# Patient Record
Sex: FEMALE | Race: BLACK OR AFRICAN AMERICAN | Employment: PART TIME | URBAN - METROPOLITAN AREA
[De-identification: names, ages, dates, MRNs, and addresses within clinical notes are randomized per-mention and may not be internally consistent; named-entity substitution may affect disease eponyms.]

---

## 2021-07-23 ENCOUNTER — HOSPITAL ENCOUNTER (OUTPATIENT)
Facility: CLINIC | Age: 31
Setting detail: OBSERVATION
Discharge: HOME | End: 2021-07-24
Attending: EMERGENCY MEDICINE | Admitting: SURGERY
Payer: COMMERCIAL

## 2021-07-23 ENCOUNTER — APPOINTMENT (OUTPATIENT)
Dept: ULTRASOUND IMAGING | Facility: CLINIC | Age: 31
End: 2021-07-23
Payer: COMMERCIAL

## 2021-07-23 DIAGNOSIS — K81.0 ACUTE CHOLECYSTITIS: ICD-10-CM

## 2021-07-23 LAB
ALBUMIN SERPL-MCNC: 3.5 G/DL (ref 3.4–5)
ALBUMIN UR-MCNC: 10 MG/DL
ALP SERPL-CCNC: 111 U/L (ref 40–150)
ALT SERPL W P-5'-P-CCNC: 76 U/L (ref 0–50)
ANION GAP SERPL CALCULATED.3IONS-SCNC: 4 MMOL/L (ref 3–14)
APPEARANCE UR: CLEAR
AST SERPL W P-5'-P-CCNC: 91 U/L (ref 0–45)
BACTERIA #/AREA URNS HPF: ABNORMAL /HPF
BASOPHILS # BLD AUTO: 0.1 10E3/UL (ref 0–0.2)
BASOPHILS NFR BLD AUTO: 1 %
BILIRUB SERPL-MCNC: 0.4 MG/DL (ref 0.2–1.3)
BILIRUB UR QL STRIP: NEGATIVE
BUN SERPL-MCNC: 13 MG/DL (ref 7–30)
CALCIUM SERPL-MCNC: 8.8 MG/DL (ref 8.5–10.1)
CHLORIDE BLD-SCNC: 112 MMOL/L (ref 94–109)
CO2 SERPL-SCNC: 24 MMOL/L (ref 20–32)
COLOR UR AUTO: YELLOW
CREAT SERPL-MCNC: 0.68 MG/DL (ref 0.52–1.04)
EOSINOPHIL # BLD AUTO: 0.1 10E3/UL (ref 0–0.7)
EOSINOPHIL NFR BLD AUTO: 1 %
ERYTHROCYTE [DISTWIDTH] IN BLOOD BY AUTOMATED COUNT: 13 % (ref 10–15)
GFR SERPL CREATININE-BSD FRML MDRD: >90 ML/MIN/1.73M2
GLUCOSE BLD-MCNC: 126 MG/DL (ref 70–99)
GLUCOSE UR STRIP-MCNC: NEGATIVE MG/DL
HCG UR QL: NEGATIVE
HCT VFR BLD AUTO: 40 % (ref 35–47)
HGB BLD-MCNC: 13 G/DL (ref 11.7–15.7)
HGB UR QL STRIP: NEGATIVE
IMM GRANULOCYTES # BLD: 0.1 10E3/UL
IMM GRANULOCYTES NFR BLD: 1 %
KETONES UR STRIP-MCNC: NEGATIVE MG/DL
LEUKOCYTE ESTERASE UR QL STRIP: NEGATIVE
LIPASE SERPL-CCNC: 239 U/L (ref 73–393)
LYMPHOCYTES # BLD AUTO: 2.4 10E3/UL (ref 0.8–5.3)
LYMPHOCYTES NFR BLD AUTO: 15 %
MCH RBC QN AUTO: 30.3 PG (ref 26.5–33)
MCHC RBC AUTO-ENTMCNC: 32.5 G/DL (ref 31.5–36.5)
MCV RBC AUTO: 93 FL (ref 78–100)
MONOCYTES # BLD AUTO: 0.6 10E3/UL (ref 0–1.3)
MONOCYTES NFR BLD AUTO: 4 %
MUCOUS THREADS #/AREA URNS LPF: PRESENT /LPF
NEUTROPHILS # BLD AUTO: 12.3 10E3/UL (ref 1.6–8.3)
NEUTROPHILS NFR BLD AUTO: 78 %
NITRATE UR QL: NEGATIVE
NRBC # BLD AUTO: 0 10E3/UL
NRBC BLD AUTO-RTO: 0 /100
PH UR STRIP: 6.5 [PH] (ref 5–7)
PLATELET # BLD AUTO: 181 10E3/UL (ref 150–450)
POTASSIUM BLD-SCNC: 3.6 MMOL/L (ref 3.4–5.3)
PROT SERPL-MCNC: 7.2 G/DL (ref 6.8–8.8)
RBC # BLD AUTO: 4.29 10E6/UL (ref 3.8–5.2)
RBC URINE: <1 /HPF
SODIUM SERPL-SCNC: 140 MMOL/L (ref 133–144)
SP GR UR STRIP: 1.02 (ref 1–1.03)
SQUAMOUS EPITHELIAL: 1 /HPF
UROBILINOGEN UR STRIP-MCNC: NORMAL MG/DL
WBC # BLD AUTO: 15.6 10E3/UL (ref 4–11)
WBC URINE: 1 /HPF

## 2021-07-23 PROCEDURE — 81025 URINE PREGNANCY TEST: CPT | Performed by: STUDENT IN AN ORGANIZED HEALTH CARE EDUCATION/TRAINING PROGRAM

## 2021-07-23 PROCEDURE — C9803 HOPD COVID-19 SPEC COLLECT: HCPCS

## 2021-07-23 PROCEDURE — 87635 SARS-COV-2 COVID-19 AMP PRB: CPT | Performed by: STUDENT IN AN ORGANIZED HEALTH CARE EDUCATION/TRAINING PROGRAM

## 2021-07-23 PROCEDURE — 81001 URINALYSIS AUTO W/SCOPE: CPT | Performed by: STUDENT IN AN ORGANIZED HEALTH CARE EDUCATION/TRAINING PROGRAM

## 2021-07-23 PROCEDURE — 85004 AUTOMATED DIFF WBC COUNT: CPT | Performed by: STUDENT IN AN ORGANIZED HEALTH CARE EDUCATION/TRAINING PROGRAM

## 2021-07-23 PROCEDURE — 36415 COLL VENOUS BLD VENIPUNCTURE: CPT | Performed by: STUDENT IN AN ORGANIZED HEALTH CARE EDUCATION/TRAINING PROGRAM

## 2021-07-23 PROCEDURE — 93005 ELECTROCARDIOGRAM TRACING: CPT

## 2021-07-23 PROCEDURE — 80053 COMPREHEN METABOLIC PANEL: CPT | Performed by: STUDENT IN AN ORGANIZED HEALTH CARE EDUCATION/TRAINING PROGRAM

## 2021-07-23 PROCEDURE — 96375 TX/PRO/DX INJ NEW DRUG ADDON: CPT

## 2021-07-23 PROCEDURE — 76705 ECHO EXAM OF ABDOMEN: CPT

## 2021-07-23 PROCEDURE — 83690 ASSAY OF LIPASE: CPT | Performed by: STUDENT IN AN ORGANIZED HEALTH CARE EDUCATION/TRAINING PROGRAM

## 2021-07-23 PROCEDURE — 96365 THER/PROPH/DIAG IV INF INIT: CPT

## 2021-07-23 PROCEDURE — 99285 EMERGENCY DEPT VISIT HI MDM: CPT | Mod: 25

## 2021-07-23 RX ORDER — CEFTRIAXONE 1 G/1
1 INJECTION, POWDER, FOR SOLUTION INTRAMUSCULAR; INTRAVENOUS ONCE
Status: COMPLETED | OUTPATIENT
Start: 2021-07-24 | End: 2021-07-24

## 2021-07-23 RX ORDER — ONDANSETRON 2 MG/ML
4 INJECTION INTRAMUSCULAR; INTRAVENOUS EVERY 30 MIN PRN
Status: DISCONTINUED | OUTPATIENT
Start: 2021-07-23 | End: 2021-07-24

## 2021-07-23 ASSESSMENT — ENCOUNTER SYMPTOMS
NECK PAIN: 0
BACK PAIN: 0
ABDOMINAL PAIN: 1
VOMITING: 1
DYSURIA: 0
DIAPHORESIS: 1
FLANK PAIN: 0
LIGHT-HEADEDNESS: 0
CHILLS: 0
MYALGIAS: 0
APPETITE CHANGE: 0
CHEST TIGHTNESS: 0
NAUSEA: 1
PALPITATIONS: 0
SHORTNESS OF BREATH: 0
COUGH: 0
ACTIVITY CHANGE: 0
CONSTIPATION: 0
DIZZINESS: 0
FEVER: 0
COLOR CHANGE: 0

## 2021-07-24 ENCOUNTER — ANESTHESIA (OUTPATIENT)
Dept: SURGERY | Facility: CLINIC | Age: 31
End: 2021-07-24
Payer: COMMERCIAL

## 2021-07-24 ENCOUNTER — APPOINTMENT (OUTPATIENT)
Dept: GENERAL RADIOLOGY | Facility: CLINIC | Age: 31
End: 2021-07-24
Attending: INTERNAL MEDICINE
Payer: COMMERCIAL

## 2021-07-24 ENCOUNTER — ANESTHESIA EVENT (OUTPATIENT)
Dept: SURGERY | Facility: CLINIC | Age: 31
End: 2021-07-24
Payer: COMMERCIAL

## 2021-07-24 VITALS
OXYGEN SATURATION: 99 % | HEIGHT: 63 IN | TEMPERATURE: 97.1 F | WEIGHT: 175 LBS | SYSTOLIC BLOOD PRESSURE: 122 MMHG | RESPIRATION RATE: 29 BRPM | HEART RATE: 65 BPM | BODY MASS INDEX: 31.01 KG/M2 | DIASTOLIC BLOOD PRESSURE: 82 MMHG

## 2021-07-24 LAB
ALBUMIN SERPL-MCNC: 3.3 G/DL (ref 3.4–5)
ALP SERPL-CCNC: 162 U/L (ref 40–150)
ALT SERPL W P-5'-P-CCNC: 992 U/L (ref 0–50)
ANION GAP SERPL CALCULATED.3IONS-SCNC: 5 MMOL/L (ref 3–14)
AST SERPL W P-5'-P-CCNC: 1162 U/L (ref 0–45)
BILIRUB SERPL-MCNC: 0.9 MG/DL (ref 0.2–1.3)
BUN SERPL-MCNC: 12 MG/DL (ref 7–30)
CALCIUM SERPL-MCNC: 9 MG/DL (ref 8.5–10.1)
CHLORIDE BLD-SCNC: 112 MMOL/L (ref 94–109)
CO2 SERPL-SCNC: 24 MMOL/L (ref 20–32)
CREAT SERPL-MCNC: 0.65 MG/DL (ref 0.52–1.04)
ERYTHROCYTE [DISTWIDTH] IN BLOOD BY AUTOMATED COUNT: 13.1 % (ref 10–15)
GFR SERPL CREATININE-BSD FRML MDRD: >90 ML/MIN/1.73M2
GLUCOSE BLD-MCNC: 107 MG/DL (ref 70–99)
HCT VFR BLD AUTO: 40.7 % (ref 35–47)
HGB BLD-MCNC: 13.1 G/DL (ref 11.7–15.7)
MCH RBC QN AUTO: 29.3 PG (ref 26.5–33)
MCHC RBC AUTO-ENTMCNC: 32.2 G/DL (ref 31.5–36.5)
MCV RBC AUTO: 91 FL (ref 78–100)
PLATELET # BLD AUTO: 195 10E3/UL (ref 150–450)
POTASSIUM BLD-SCNC: 3.7 MMOL/L (ref 3.4–5.3)
PROT SERPL-MCNC: 7 G/DL (ref 6.8–8.8)
RBC # BLD AUTO: 4.47 10E6/UL (ref 3.8–5.2)
SARS-COV-2 RNA RESP QL NAA+PROBE: NEGATIVE
SODIUM SERPL-SCNC: 141 MMOL/L (ref 133–144)
WBC # BLD AUTO: 8.9 10E3/UL (ref 4–11)

## 2021-07-24 PROCEDURE — 258N000003 HC RX IP 258 OP 636: Performed by: PHYSICIAN ASSISTANT

## 2021-07-24 PROCEDURE — 255N000002 HC RX 255 OP 636: Performed by: SURGERY

## 2021-07-24 PROCEDURE — 258N000003 HC RX IP 258 OP 636: Performed by: ANESTHESIOLOGY

## 2021-07-24 PROCEDURE — 370N000017 HC ANESTHESIA TECHNICAL FEE, PER MIN: Performed by: SURGERY

## 2021-07-24 PROCEDURE — 250N000011 HC RX IP 250 OP 636: Performed by: ANESTHESIOLOGY

## 2021-07-24 PROCEDURE — 36415 COLL VENOUS BLD VENIPUNCTURE: CPT | Performed by: INTERNAL MEDICINE

## 2021-07-24 PROCEDURE — 999N000180 XR SURGERY CARM FLUORO LESS THAN 5 MIN

## 2021-07-24 PROCEDURE — 710N000009 HC RECOVERY PHASE 1, LEVEL 1, PER MIN: Performed by: SURGERY

## 2021-07-24 PROCEDURE — 250N000011 HC RX IP 250 OP 636: Performed by: INTERNAL MEDICINE

## 2021-07-24 PROCEDURE — 99204 OFFICE O/P NEW MOD 45 MIN: CPT | Mod: 57 | Performed by: SURGERY

## 2021-07-24 PROCEDURE — 360N000083 HC SURGERY LEVEL 3 W/ FLUORO, PER MIN: Performed by: SURGERY

## 2021-07-24 PROCEDURE — 96375 TX/PRO/DX INJ NEW DRUG ADDON: CPT

## 2021-07-24 PROCEDURE — 99207 PR APP CREDIT; MD BILLING SHARED VISIT: CPT | Performed by: PHYSICIAN ASSISTANT

## 2021-07-24 PROCEDURE — 88304 TISSUE EXAM BY PATHOLOGIST: CPT | Mod: TC | Performed by: SURGERY

## 2021-07-24 PROCEDURE — G0378 HOSPITAL OBSERVATION PER HR: HCPCS

## 2021-07-24 PROCEDURE — 999N000141 HC STATISTIC PRE-PROCEDURE NURSING ASSESSMENT: Performed by: SURGERY

## 2021-07-24 PROCEDURE — 250N000011 HC RX IP 250 OP 636: Performed by: EMERGENCY MEDICINE

## 2021-07-24 PROCEDURE — 99207 PR CDG-CODE CATEGORY CHANGED: CPT | Performed by: INTERNAL MEDICINE

## 2021-07-24 PROCEDURE — 250N000013 HC RX MED GY IP 250 OP 250 PS 637: Performed by: SURGERY

## 2021-07-24 PROCEDURE — 272N000001 HC OR GENERAL SUPPLY STERILE: Performed by: SURGERY

## 2021-07-24 PROCEDURE — 99235 HOSP IP/OBS SAME DATE MOD 70: CPT | Performed by: INTERNAL MEDICINE

## 2021-07-24 PROCEDURE — 710N000012 HC RECOVERY PHASE 2, PER MINUTE: Performed by: SURGERY

## 2021-07-24 PROCEDURE — 250N000011 HC RX IP 250 OP 636: Performed by: NURSE ANESTHETIST, CERTIFIED REGISTERED

## 2021-07-24 PROCEDURE — 47563 LAPARO CHOLECYSTECTOMY/GRAPH: CPT | Mod: AS | Performed by: PHYSICIAN ASSISTANT

## 2021-07-24 PROCEDURE — 250N000009 HC RX 250: Performed by: NURSE ANESTHETIST, CERTIFIED REGISTERED

## 2021-07-24 PROCEDURE — 47563 LAPARO CHOLECYSTECTOMY/GRAPH: CPT | Performed by: SURGERY

## 2021-07-24 PROCEDURE — 96365 THER/PROPH/DIAG IV INF INIT: CPT | Mod: XU

## 2021-07-24 PROCEDURE — 250N000009 HC RX 250: Performed by: SURGERY

## 2021-07-24 PROCEDURE — 85027 COMPLETE CBC AUTOMATED: CPT | Performed by: INTERNAL MEDICINE

## 2021-07-24 PROCEDURE — 82040 ASSAY OF SERUM ALBUMIN: CPT | Performed by: INTERNAL MEDICINE

## 2021-07-24 RX ORDER — FENTANYL CITRATE 50 UG/ML
50 INJECTION, SOLUTION INTRAMUSCULAR; INTRAVENOUS EVERY 5 MIN PRN
Status: DISCONTINUED | OUTPATIENT
Start: 2021-07-24 | End: 2021-07-24 | Stop reason: HOSPADM

## 2021-07-24 RX ORDER — GLYCOPYRROLATE 0.2 MG/ML
INJECTION, SOLUTION INTRAMUSCULAR; INTRAVENOUS PRN
Status: DISCONTINUED | OUTPATIENT
Start: 2021-07-24 | End: 2021-07-24

## 2021-07-24 RX ORDER — ONDANSETRON 4 MG/1
4 TABLET, ORALLY DISINTEGRATING ORAL EVERY 6 HOURS PRN
Status: DISCONTINUED | OUTPATIENT
Start: 2021-07-24 | End: 2021-07-24 | Stop reason: HOSPADM

## 2021-07-24 RX ORDER — ONDANSETRON 4 MG/1
4 TABLET, ORALLY DISINTEGRATING ORAL EVERY 30 MIN PRN
Status: DISCONTINUED | OUTPATIENT
Start: 2021-07-24 | End: 2021-07-24 | Stop reason: HOSPADM

## 2021-07-24 RX ORDER — ONDANSETRON 2 MG/ML
4 INJECTION INTRAMUSCULAR; INTRAVENOUS EVERY 30 MIN PRN
Status: DISCONTINUED | OUTPATIENT
Start: 2021-07-24 | End: 2021-07-24 | Stop reason: HOSPADM

## 2021-07-24 RX ORDER — FENTANYL CITRATE 50 UG/ML
50 INJECTION, SOLUTION INTRAMUSCULAR; INTRAVENOUS EVERY 5 MIN PRN
Status: ACTIVE | OUTPATIENT
Start: 2021-07-24 | End: 2021-07-24

## 2021-07-24 RX ORDER — PROPOFOL 10 MG/ML
INJECTION, EMULSION INTRAVENOUS PRN
Status: DISCONTINUED | OUTPATIENT
Start: 2021-07-24 | End: 2021-07-24

## 2021-07-24 RX ORDER — SODIUM CHLORIDE, SODIUM LACTATE, POTASSIUM CHLORIDE, CALCIUM CHLORIDE 600; 310; 30; 20 MG/100ML; MG/100ML; MG/100ML; MG/100ML
INJECTION, SOLUTION INTRAVENOUS CONTINUOUS
Status: DISCONTINUED | OUTPATIENT
Start: 2021-07-24 | End: 2021-07-24 | Stop reason: HOSPADM

## 2021-07-24 RX ORDER — LIDOCAINE HYDROCHLORIDE 20 MG/ML
INJECTION, SOLUTION INFILTRATION; PERINEURAL PRN
Status: DISCONTINUED | OUTPATIENT
Start: 2021-07-24 | End: 2021-07-24

## 2021-07-24 RX ORDER — ONDANSETRON 2 MG/ML
4 INJECTION INTRAMUSCULAR; INTRAVENOUS EVERY 6 HOURS PRN
Status: DISCONTINUED | OUTPATIENT
Start: 2021-07-24 | End: 2021-07-24 | Stop reason: HOSPADM

## 2021-07-24 RX ORDER — LIDOCAINE 40 MG/G
CREAM TOPICAL
Status: DISCONTINUED | OUTPATIENT
Start: 2021-07-24 | End: 2021-07-24 | Stop reason: HOSPADM

## 2021-07-24 RX ORDER — LANOLIN ALCOHOL/MO/W.PET/CERES
3 CREAM (GRAM) TOPICAL
Status: DISCONTINUED | OUTPATIENT
Start: 2021-07-24 | End: 2021-07-24 | Stop reason: HOSPADM

## 2021-07-24 RX ORDER — CEFAZOLIN SODIUM 2 G/100ML
2 INJECTION, SOLUTION INTRAVENOUS SEE ADMIN INSTRUCTIONS
Status: DISCONTINUED | OUTPATIENT
Start: 2021-07-24 | End: 2021-07-24 | Stop reason: HOSPADM

## 2021-07-24 RX ORDER — PROCHLORPERAZINE 25 MG
25 SUPPOSITORY, RECTAL RECTAL EVERY 12 HOURS PRN
Status: DISCONTINUED | OUTPATIENT
Start: 2021-07-24 | End: 2021-07-24 | Stop reason: HOSPADM

## 2021-07-24 RX ORDER — CEFAZOLIN SODIUM 2 G/100ML
INJECTION, SOLUTION INTRAVENOUS PRN
Status: DISCONTINUED | OUTPATIENT
Start: 2021-07-24 | End: 2021-07-24

## 2021-07-24 RX ORDER — HYDROCODONE BITARTRATE AND ACETAMINOPHEN 5; 325 MG/1; MG/1
1-2 TABLET ORAL EVERY 4 HOURS PRN
Qty: 10 TABLET | Refills: 0 | Status: SHIPPED | OUTPATIENT
Start: 2021-07-24

## 2021-07-24 RX ORDER — NEOSTIGMINE METHYLSULFATE 1 MG/ML
VIAL (ML) INJECTION PRN
Status: DISCONTINUED | OUTPATIENT
Start: 2021-07-24 | End: 2021-07-24

## 2021-07-24 RX ORDER — BUPIVACAINE HYDROCHLORIDE 5 MG/ML
INJECTION, SOLUTION EPIDURAL; INTRACAUDAL PRN
Status: DISCONTINUED | OUTPATIENT
Start: 2021-07-24 | End: 2021-07-24 | Stop reason: HOSPADM

## 2021-07-24 RX ORDER — ACETAMINOPHEN 650 MG/1
650 SUPPOSITORY RECTAL EVERY 6 HOURS PRN
Status: DISCONTINUED | OUTPATIENT
Start: 2021-07-24 | End: 2021-07-24 | Stop reason: HOSPADM

## 2021-07-24 RX ORDER — LABETALOL HYDROCHLORIDE 5 MG/ML
10 INJECTION, SOLUTION INTRAVENOUS EVERY 10 MIN PRN
Status: DISCONTINUED | OUTPATIENT
Start: 2021-07-24 | End: 2021-07-24 | Stop reason: HOSPADM

## 2021-07-24 RX ORDER — HYDROMORPHONE HCL IN WATER/PF 6 MG/30 ML
0.2 PATIENT CONTROLLED ANALGESIA SYRINGE INTRAVENOUS EVERY 5 MIN PRN
Status: ACTIVE | OUTPATIENT
Start: 2021-07-24 | End: 2021-07-24

## 2021-07-24 RX ORDER — AMOXICILLIN 250 MG
2 CAPSULE ORAL 2 TIMES DAILY PRN
Status: DISCONTINUED | OUTPATIENT
Start: 2021-07-24 | End: 2021-07-24 | Stop reason: HOSPADM

## 2021-07-24 RX ORDER — HYDROCODONE BITARTRATE AND ACETAMINOPHEN 5; 325 MG/1; MG/1
1 TABLET ORAL
Status: COMPLETED | OUTPATIENT
Start: 2021-07-24 | End: 2021-07-24

## 2021-07-24 RX ORDER — ALBUTEROL SULFATE 0.83 MG/ML
2.5 SOLUTION RESPIRATORY (INHALATION) EVERY 4 HOURS PRN
Status: DISCONTINUED | OUTPATIENT
Start: 2021-07-24 | End: 2021-07-24 | Stop reason: HOSPADM

## 2021-07-24 RX ORDER — OXYCODONE HYDROCHLORIDE 5 MG/1
5 TABLET ORAL EVERY 4 HOURS PRN
Status: DISCONTINUED | OUTPATIENT
Start: 2021-07-24 | End: 2021-07-24 | Stop reason: HOSPADM

## 2021-07-24 RX ORDER — ACETAMINOPHEN 325 MG/1
650 TABLET ORAL EVERY 6 HOURS PRN
Status: DISCONTINUED | OUTPATIENT
Start: 2021-07-24 | End: 2021-07-24 | Stop reason: HOSPADM

## 2021-07-24 RX ORDER — FENTANYL CITRATE 50 UG/ML
INJECTION, SOLUTION INTRAMUSCULAR; INTRAVENOUS PRN
Status: DISCONTINUED | OUTPATIENT
Start: 2021-07-24 | End: 2021-07-24

## 2021-07-24 RX ORDER — CEFTRIAXONE 1 G/1
1 INJECTION, POWDER, FOR SOLUTION INTRAMUSCULAR; INTRAVENOUS EVERY 24 HOURS
Status: DISCONTINUED | OUTPATIENT
Start: 2021-07-24 | End: 2021-07-24 | Stop reason: HOSPADM

## 2021-07-24 RX ORDER — MEPERIDINE HYDROCHLORIDE 25 MG/ML
12.5 INJECTION INTRAMUSCULAR; INTRAVENOUS; SUBCUTANEOUS
Status: DISCONTINUED | OUTPATIENT
Start: 2021-07-24 | End: 2021-07-24 | Stop reason: HOSPADM

## 2021-07-24 RX ORDER — AMOXICILLIN 250 MG
1 CAPSULE ORAL 2 TIMES DAILY PRN
Status: DISCONTINUED | OUTPATIENT
Start: 2021-07-24 | End: 2021-07-24 | Stop reason: HOSPADM

## 2021-07-24 RX ORDER — CEFAZOLIN SODIUM 2 G/100ML
2 INJECTION, SOLUTION INTRAVENOUS
Status: DISCONTINUED | OUTPATIENT
Start: 2021-07-24 | End: 2021-07-24 | Stop reason: HOSPADM

## 2021-07-24 RX ORDER — DEXAMETHASONE SODIUM PHOSPHATE 4 MG/ML
INJECTION, SOLUTION INTRA-ARTICULAR; INTRALESIONAL; INTRAMUSCULAR; INTRAVENOUS; SOFT TISSUE PRN
Status: DISCONTINUED | OUTPATIENT
Start: 2021-07-24 | End: 2021-07-24

## 2021-07-24 RX ORDER — PROCHLORPERAZINE MALEATE 5 MG
10 TABLET ORAL EVERY 6 HOURS PRN
Status: DISCONTINUED | OUTPATIENT
Start: 2021-07-24 | End: 2021-07-24 | Stop reason: HOSPADM

## 2021-07-24 RX ADMIN — HYDROCODONE BITARTRATE AND ACETAMINOPHEN 1 TABLET: 5; 325 TABLET ORAL at 16:02

## 2021-07-24 RX ADMIN — FENTANYL CITRATE 50 MCG: 50 INJECTION, SOLUTION INTRAMUSCULAR; INTRAVENOUS at 14:15

## 2021-07-24 RX ADMIN — CEFTRIAXONE 1 G: 1 INJECTION, POWDER, FOR SOLUTION INTRAMUSCULAR; INTRAVENOUS at 00:39

## 2021-07-24 RX ADMIN — METRONIDAZOLE 500 MG: 500 INJECTION, SOLUTION INTRAVENOUS at 01:06

## 2021-07-24 RX ADMIN — ROCURONIUM BROMIDE 50 MG: 10 INJECTION INTRAVENOUS at 14:06

## 2021-07-24 RX ADMIN — MIDAZOLAM 2 MG: 1 INJECTION INTRAMUSCULAR; INTRAVENOUS at 13:59

## 2021-07-24 RX ADMIN — FENTANYL CITRATE 50 MCG: 50 INJECTION, SOLUTION INTRAMUSCULAR; INTRAVENOUS at 15:04

## 2021-07-24 RX ADMIN — FENTANYL CITRATE 50 MCG: 50 INJECTION, SOLUTION INTRAMUSCULAR; INTRAVENOUS at 15:11

## 2021-07-24 RX ADMIN — FENTANYL CITRATE 50 MCG: 50 INJECTION, SOLUTION INTRAMUSCULAR; INTRAVENOUS at 14:06

## 2021-07-24 RX ADMIN — DEXAMETHASONE SODIUM PHOSPHATE 4 MG: 4 INJECTION, SOLUTION INTRA-ARTICULAR; INTRALESIONAL; INTRAMUSCULAR; INTRAVENOUS; SOFT TISSUE at 14:06

## 2021-07-24 RX ADMIN — NEOSTIGMINE METHYLSULFATE 4 MG: 1 INJECTION, SOLUTION INTRAVENOUS at 14:54

## 2021-07-24 RX ADMIN — METRONIDAZOLE 500 MG: 500 INJECTION, SOLUTION INTRAVENOUS at 08:37

## 2021-07-24 RX ADMIN — PROPOFOL 10 MG: 10 INJECTION, EMULSION INTRAVENOUS at 14:05

## 2021-07-24 RX ADMIN — LIDOCAINE HYDROCHLORIDE 50 MG: 20 INJECTION, SOLUTION INFILTRATION; PERINEURAL at 14:05

## 2021-07-24 RX ADMIN — CEFAZOLIN SODIUM 2 G: 2 INJECTION, SOLUTION INTRAVENOUS at 14:16

## 2021-07-24 RX ADMIN — GLYCOPYRROLATE 0.6 MG: 0.2 INJECTION, SOLUTION INTRAMUSCULAR; INTRAVENOUS at 14:54

## 2021-07-24 RX ADMIN — ONDANSETRON HYDROCHLORIDE 4 MG: 2 INJECTION, SOLUTION INTRAVENOUS at 14:24

## 2021-07-24 RX ADMIN — SODIUM CHLORIDE, POTASSIUM CHLORIDE, SODIUM LACTATE AND CALCIUM CHLORIDE: 600; 310; 30; 20 INJECTION, SOLUTION INTRAVENOUS at 13:59

## 2021-07-24 RX ADMIN — SODIUM CHLORIDE, POTASSIUM CHLORIDE, SODIUM LACTATE AND CALCIUM CHLORIDE: 600; 310; 30; 20 INJECTION, SOLUTION INTRAVENOUS at 09:37

## 2021-07-24 ASSESSMENT — MIFFLIN-ST. JEOR: SCORE: 1477.92

## 2021-07-24 NOTE — PLAN OF CARE
"PRIMARY DIAGNOSIS: CHOLECYSTITIS  OUTPATIENT/OBSERVATION GOALS TO BE MET BEFORE DISCHARGE:    1. Pain status: Improved with use of alternative comfort measures i.e.: positioning  2. Stable vital signs and labs (if performed) at disposition: Yes  3. Tolerating adequate PO diet: No, NPO for procedure  4. Successful cholecystectomy or clear follow up plan with General Surgery team if immediate surgery not performed Yes, sx consult  5. ADLs back to baseline?  Yes  6. Activity and level of assistance: Ambulating independently.  7. Barriers to discharge noted Yes, not cleared by sx consult    Discharge Planner Nurse   Safe discharge environment identified: Yes  Barriers to discharge: Yes       Entered by: Daya Baltazar 07/24/2021 4:36 AM   Patient resting comfortably, denies nausea/emesis. Reports pain 4/10 in right upper abdomen. NPO for sx. Consult and potential procedure in am.     /65 (BP Location: Right arm)   Pulse 91   Temp 98.2  F (36.8  C) (Oral)   Resp 20   Ht 1.6 m (5' 3\")   Wt 79.4 kg (175 lb)   SpO2 100%   BMI 31.00 kg/m      Please review provider order for any additional goals.   Nurse to notify provider when observation goals have been met and patient is ready for discharge.    "

## 2021-07-24 NOTE — ANESTHESIA PREPROCEDURE EVALUATION
Anesthesia Pre-Procedure Evaluation    Patient: Kalli Negrete   MRN: [id number] : 1990        Preoperative Diagnosis: Acute cholecystitis [K81.0]   Procedure : Procedure(s):  CHOLECYSTECTOMY, LAPAROSCOPIC, WITH CHOLANGIOGRAM     Past Medical History:   Diagnosis Date     Gallstone       Past Surgical History:   Procedure Laterality Date     NO HISTORY OF SURGERY        No Known Allergies   Social History     Tobacco Use     Smoking status: Never Smoker     Smokeless tobacco: Never Used   Substance Use Topics     Alcohol use: Not on file      Wt Readings from Last 1 Encounters:   21 79.4 kg (175 lb)        Anesthesia Evaluation   Pt has had prior anesthetic. Type: General.    No history of anesthetic complications       ROS/MED HX  ENT/Pulmonary:  - neg pulmonary ROS     Neurologic:  - neg neurologic ROS     Cardiovascular:  - neg cardiovascular ROS     METS/Exercise Tolerance:     Hematologic:  - neg hematologic  ROS     Musculoskeletal:  - neg musculoskeletal ROS     GI/Hepatic:     (+) cholecystitis/cholelithiasis,     Renal/Genitourinary:  - neg Renal ROS     Endo:  - neg endo ROS     Psychiatric/Substance Use:  - neg psychiatric ROS     Infectious Disease:  - neg infectious disease ROS     Malignancy:  - neg malignancy ROS     Other:  - neg other ROS          Physical Exam    Airway        Mallampati: I   TM distance: > 3 FB   Neck ROM: full     Respiratory Devices and Support         Dental  no notable dental history         Cardiovascular   cardiovascular exam normal          Pulmonary   pulmonary exam normal                OUTSIDE LABS:  CBC:   Lab Results   Component Value Date    WBC 8.9 2021    WBC 15.6 (H) 2021    HGB 13.1 2021    HGB 13.0 2021    HCT 40.7 2021    HCT 40.0 2021     2021     2021     BMP:   Lab Results   Component Value Date     2021     2021    POTASSIUM 3.7 2021    POTASSIUM 3.6  07/23/2021    CHLORIDE 112 (H) 07/24/2021    CHLORIDE 112 (H) 07/23/2021    CO2 24 07/24/2021    CO2 24 07/23/2021    BUN 12 07/24/2021    BUN 13 07/23/2021    CR 0.65 07/24/2021    CR 0.68 07/23/2021     (H) 07/24/2021     (H) 07/23/2021     COAGS: No results found for: PTT, INR, FIBR  POC:   Lab Results   Component Value Date    HCG Negative 07/23/2021     HEPATIC:   Lab Results   Component Value Date    ALBUMIN 3.3 (L) 07/24/2021    PROTTOTAL 7.0 07/24/2021     (HH) 07/24/2021    AST 1,162 (HH) 07/24/2021    ALKPHOS 162 (H) 07/24/2021    BILITOTAL 0.9 07/24/2021     OTHER:   Lab Results   Component Value Date    SHANTA 9.0 07/24/2021    LIPASE 239 07/23/2021       Anesthesia Plan    ASA Status:  1   NPO Status:  NPO Appropriate    Anesthesia Type: General.     - Airway: ETT   Induction: Intravenous, Propofol.   Maintenance: Balanced.        Consents    Anesthesia Plan(s) and associated risks, benefits, and realistic alternatives discussed. Questions answered and patient/representative(s) expressed understanding.     - Discussed with:  Patient         Postoperative Care    Pain management: IV analgesics, Oral pain medications, Multi-modal analgesia.   PONV prophylaxis: Ondansetron (or other 5HT-3), Dexamethasone or Solumedrol     Comments:                Jef Noel MD

## 2021-07-24 NOTE — PLAN OF CARE
ROOM # 202-2    Living Situation (if not independent, order SW consult): Independent  Facility name:  :     Activity level at baseline: Independent  Activity level on admit: Independent      Patient registered to observation; given Patient Bill of Rights; given the opportunity to ask questions about observation status and their plan of care.  Patient has been oriented to the observation room, bathroom and call light is in place.    Discussed discharge goals and expectations with patient/family.       Patient a&ox4. Reporting pain 5/10 in RUQ of abdomen. Denies nausea, SOB, numbness/tingling. NPO for sx in am. VS stable.

## 2021-07-24 NOTE — H&P
History and Physical     Kalli Negrete MRN# [id number]   YOB: 1990 Age: 30 year old      Date of Admission:  7/23/2021  Date of Service   7/24/21    Primary care provider: No primary care provider on file.          Assessment and Plan:     Summary of Stay: Kalli Negrete is a 30 year old female with a history of gallstones admitted on 7/23/2021 with RUQ pain, mildly elevated LFTs with US showing GB full of gallstones with a positive hood's sign consistent with cholecystitis    About a year ago had similar pain and diagnosed with gallstones.  She was set up for a cholecystectomy that was postponed as she had become pregnant.  She has done well since that time until the day of presentation    She has been having intermittent RUQ abdominal pain.  That emmanuel after eating pizza it was more severe and she felt nauseated but did not vomit even though she tried to make herself vomit.   She denies any f/c/s.  She states that after treatment in the ER she is feeling much better and without abdominal pain     In the ER VSS and she is afebrile.  CMP wnl x mildly elevated ALT/ALT, bili and alk phos are wnl, lipase is 239.  Cbc notable for wbc 15.6 with a neutrophilia and mild left shift.  UA negative for inflammatory cells.  Pregnancy testing is negative.  COVID is negative    Problem List:   Gallstones/cholecystitis  -admit to obs/npo/ivf/pain and antiemetics prn  -surgical consultation    Recent pregnancy  Her baby is 8 months old and she is not currently breast feeding      DVT Prophylaxis: Ambulate every shift  Code Status: Full Code  Functional Status:  independent  [place]: not needed  Access:  PIV              Chief Complaint:     Abdominal pain        History of Present Illness:     Kalli Negrete is a 30 year old female with a history of gallstones admitted on 7/23/2021 with RUQ pain, mildly elevated LFTs with US showing GB full of gallstones with a positive hood's sign consistent with  "cholecystitis    About a year ago had similar pain and diagnosed with gallstones.  She was set up for a cholecystectomy that was postponed as she had become pregnant.  She has done well since that time until the day of presentation    She has been having intermittent RUQ abdominal pain.  That emmanuel after eating pizza it was more severe and she felt nauseated but did not vomit even though she tried to make herself vomit.   She denies any f/c/s.      In the ER VSS and she is afebrile.  CMP wnl x mildly elevated ALT/ALT, bili and alk phos are wnl, lipase is 239.  Cbc notable for wbc 15.6 with a neutrophilia and mild left shift.  UA negative for inflammatory cells.  Pregnancy testing is negative.  COVID is negative    The history is obtained in discussion with the ER provider Dr Torrez and the patient with good reliability         Past Medical History:     Past Medical History:   Diagnosis Date     Gallstone              Past Surgical History:     Past Surgical History:   Procedure Laterality Date     NO HISTORY OF SURGERY               Social History:     Social History     Tobacco Use     Smoking status: Never Smoker     Smokeless tobacco: Never Used   Substance Use Topics     Alcohol use: Not on file             Family History:   I have reviewed this patient's family history         Allergies:   No Known Allergies          Medications:   Await formal med rec            Review of Systems:     A Comprehensive greater than 10 system review of systems was carried out.  Pertinent positives and negatives are noted above.  Otherwise negative for contributory information.           Physical Exam:   Blood pressure 111/65, pulse 91, temperature 98.2  F (36.8  C), temperature source Oral, resp. rate 20, height 1.6 m (5' 3\"), weight 79.4 kg (175 lb), SpO2 100 %.  Exam:    General:  Pleasant nad looks stated age  HEENT:  Head nc/at sclera clear PERRL O/P:  Mask in place  Neck is supple  Lungs: cta b nl effort   CV:  RRR no m/r/g no " le edema  Abd:  S/nt/nd no r/g  Neuro:  Cn 2-12 grossly intact and moran  Alert and oriented affect appropriate   Skin:  W/d no c/c               Data:          Lab Results   Component Value Date     07/24/2021    Lab Results   Component Value Date    CHLORIDE 112 07/24/2021    Lab Results   Component Value Date    BUN 13 07/23/2021      Lab Results   Component Value Date    POTASSIUM 3.7 07/24/2021    Lab Results   Component Value Date    CO2 24 07/23/2021    Lab Results   Component Value Date    CR 0.68 07/23/2021        Lab Results   Component Value Date    WBC 8.9 07/24/2021    HGB 13.1 07/24/2021    HCT 40.7 07/24/2021    MCV 91 07/24/2021     07/24/2021     Lab Results   Component Value Date     (H) 07/23/2021     Lab Results   Component Value Date    AST 91 (H) 07/23/2021    ALT 76 (H) 07/23/2021    ALKPHOS 111 07/23/2021    BILITOTAL 0.4 07/23/2021     Lipase 239  UA is without inflammatory markers       Imaging:     Recent Results (from the past 24 hour(s))   US Abdomen Limited (RUQ)    Narrative    EXAM: US ABDOMEN LIMITED  LOCATION: [facility]  DATE/TIME: 7/23/2021 11:08 PM    INDICATION: Hx gallstones, RUQ pain  COMPARISON: None.  TECHNIQUE: Limited abdominal ultrasound.    FINDINGS:    GALLBLADDER: Gallbladder is filled with shadowing stones. No definite wall thickening. Sonographic Clark sign was reported to be positive.    BILE DUCTS: No biliary dilatation. The common duct measures 2 mm.    LIVER: Normal parenchyma with smooth contour. No focal mass.    RIGHT KIDNEY: No hydronephrosis.    PANCREAS: The visualized portions are normal.    No ascites.      Impression    IMPRESSION:  1.  Gallbladder is filled with gallstones. Although there is no definite wall thickening (measurement being compromised by amount of gallstones), the sonographic Clark sign was reported to be positive. This could reflect early changes of cholecystitis.   Recommend clinical  correlation.

## 2021-07-24 NOTE — ANESTHESIA POSTPROCEDURE EVALUATION
Patient: Kalli Negrete    Procedure(s):  CHOLECYSTECTOMY, LAPAROSCOPIC, WITH CHOLANGIOGRAM    Diagnosis:Acute cholecystitis [K81.0]  Diagnosis Additional Information: No value filed.    Anesthesia Type:  General    Note:     Postop Pain Control: Uneventful            Sign Out: Well controlled pain   PONV: No   Neuro/Psych: Uneventful            Sign Out: Acceptable/Baseline neuro status   Airway/Respiratory: Uneventful            Sign Out: Acceptable/Baseline resp. status   CV/Hemodynamics: Uneventful            Sign Out: Acceptable CV status   Other NRE: NONE   DID A NON-ROUTINE EVENT OCCUR? No           Last vitals:  Vitals Value Taken Time   /84 07/24/21 1555   Temp 97.1  F (36.2  C) 07/24/21 1507   Pulse 75 07/24/21 1558   Resp 20 07/24/21 1558   SpO2 100 % 07/24/21 1558   Vitals shown include unvalidated device data.    Electronically Signed By: Jef Noel MD  July 24, 2021  3:59 PM

## 2021-07-24 NOTE — PROVIDER NOTIFICATION
DATE:  7/24/2021   TIME OF RECEIPT FROM LAB:  07:19 AM  LAB TEST & VALUE:  AST 1162. .  RESULTS GIVEN WITH READ-BACK TO (PROVIDER):  Nancy Quiles PA-C  TIME LAB VALUE REPORTED TO PROVIDER:   07:21 AM       07/24/21 0722   Significant Event   Significant Event Critical result/value   Critical Test Results/Notification   Critical Lab Result (Lab Name and Value) AST 1162. .   What Time Did The Lab Notify You? 0719   What Provider Did You Notify? Nancy BELLAMY   Was the Provider Notified Within 30 Min?  Yes

## 2021-07-24 NOTE — ED NOTES
[facility]  ED Nurse Handoff Report    Kalli Negrete is a 30 year old female   ED Chief complaint: Abdominal Pain  . ED Diagnosis:   Final diagnoses:   Cholelithiasis     Allergies: No Known Allergies    Code Status: Full Code  Activity level - Baseline/Home:  Independent. Activity Level - Current:   Independent. Lift room needed: No. Bariatric: No   Needed: No   Isolation: No. Infection: Not Applicable.     Vital Signs:   Vitals:    07/23/21 2116 07/23/21 2130 07/23/21 2145 07/23/21 2200   BP: 111/70 108/68 110/73    Pulse: 73 76 74    Resp:       Temp: 98.2  F (36.8  C)      TempSrc: Oral      SpO2:   100% 100%   Weight: 77.1 kg (170 lb)          Cardiac Rhythm:  ,      Pain level:    Patient confused: No. Patient Falls Risk: No.   Elimination Status: Has voided   Patient Report - Initial Complaint: ABD pain. Focused Assessment:Briefly, the patient presented with epigastric abdominal pain worse with eating.  She had similar pain 8 months prior, diagnosed with gallstones at that time, but declined surgery due to of pregnancy.  Today she presents with worse epigastric abdominal pain, 30 minutes after eating.  She denies fevers or chills.  She has no chest pain, chest pressure or shortness of breath.    Tests Performed: Labs, US. Abnormal Results:   Labs Ordered and Resulted from Time of ED Arrival Up to the Time of Departure from the ED   COMPREHENSIVE METABOLIC PANEL - Abnormal; Notable for the following components:       Result Value    Chloride 112 (*)     Glucose 126 (*)     AST 91 (*)     ALT 76 (*)     All other components within normal limits   ROUTINE UA WITH MICROSCOPIC REFLEX TO CULTURE - Abnormal; Notable for the following components:    Protein Albumin Urine 10  (*)     Bacteria Urine Few (*)     Mucus Urine Present (*)     All other components within normal limits    Narrative:     Urine Culture not indicated   CBC WITH PLATELETS AND DIFFERENTIAL - Abnormal; Notable for the  following components:    WBC Count 15.6 (*)     Absolute Neutrophils 12.3 (*)     Absolute Immature Granulocytes 0.1 (*)     All other components within normal limits   LIPASE - Normal   HCG QUALITATIVE URINE - Normal   COVID-19 VIRUS (CORONAVIRUS) BY PCR   CBC WITH PLATELETS & DIFFERENTIAL    Narrative:     The following orders were created for panel order CBC with platelets differential.  Procedure                               Abnormality         Status                     ---------                               -----------         ------                     CBC with platelets and d...[[id number]]  Abnormal            Final result                 Please view results for these tests on the individual orders.     US Abdomen Limited (RUQ)   Final Result   IMPRESSION:   1.  Gallbladder is filled with gallstones. Although there is no definite wall thickening (measurement being compromised by amount of gallstones), the sonographic Clark sign was reported to be positive. This could reflect early changes of cholecystitis.    Recommend clinical correlation.            .   Treatments provided: See MAR  Family Comments: [facility] brochure/video discussed/provided to patient:  N/A  ED Medications:   Medications   ondansetron (ZOFRAN) injection 4 mg (has no administration in time range)     Drips infusing:  No  For the majority of the shift, the patient's behavior Green. Interventions performed were N/A.    Sepsis treatment initiated: No     Patient tested for COVID 19 prior to admission: YES    ED Nurse Name/Phone Number: Darshana Cohen RN,   11:37 PM    RECEIVING UNIT ED HANDOFF REVIEW    Above ED Nurse Handoff Report was reviewed: Yes  Reviewed by: Daya Baltazar RN on July 24, 2021 at 12:33 AM

## 2021-07-24 NOTE — ANESTHESIA CARE TRANSFER NOTE
Patient: Kalli Negrete    Procedure(s):  CHOLECYSTECTOMY, LAPAROSCOPIC, WITH CHOLANGIOGRAM    Diagnosis: Acute cholecystitis [K81.0]  Diagnosis Additional Information: No value filed.    Anesthesia Type:   General     Note:    Oropharynx: oropharynx clear of all foreign objects  Level of Consciousness: drowsy  Oxygen Supplementation: face mask  Level of Supplemental Oxygen (L/min / FiO2): 6  Independent Airway: airway patency satisfactory and stable  Dentition: dentition unchanged  Vital Signs Stable: post-procedure vital signs reviewed and stable  Report to RN Given: handoff report given  Patient transferred to: PACU    Handoff Report: Identifed the Patient, Identified the Reponsible Provider, Reviewed the pertinent medical history, Discussed the surgical course, Reviewed Intra-OP anesthesia mangement and issues during anesthesia, Set expectations for post-procedure period and Allowed opportunity for questions and acknowledgement of understanding      Vitals:  Vitals Value Taken Time   /105 07/24/21 1508   Temp 97.1  F (36.2  C) 07/24/21 1507   Pulse 86 07/24/21 1509   Resp 8 07/24/21 1509   SpO2 100 % 07/24/21 1509   Vitals shown include unvalidated device data.    Electronically Signed By: DAYAN Landeros CRNA  July 24, 2021  3:11 PM

## 2021-07-24 NOTE — ED PROVIDER NOTES
History     Chief Complaint:  Abdominal Pain      HPI   Kalli Negrete is a 30 year old female history of gallstones who presents with 1 day of crampy moderately severe spiking right upper quadrant pain.  Patient states she knew she had gallstones has had 2 episodes of severe crampy pain, most recently about a year and a half ago.  She was offered cholecystectomy at that time however declined because she was pregnant and did not want to have a surgery.  She states she has had no symptoms between then and now, today she developed sudden onset nausea, right upper quadrant cramping squeezing pain that was initially very severe, epigastric pain.  This came on after eating a very cheesy pizza she states she stuck her fingers down her throat to make herself feel better, no other vomiting, there is no blood in the vomit just food.  She has had no fevers, chills, abdominal pain prior to this, no urinary symptoms.  She now is presented via EMS for evaluation and intervention.  She states that her pain is now 5-6 out of 10 in severity, still cramping located in the epigastrium and right upper quadrant.  Minimal radiation up into the chest.  No radiation to either shoulder.  States her nausea is well controlled.    Review of Systems   Constitutional: Positive for diaphoresis. Negative for activity change, appetite change, chills and fever.   HENT: Negative for congestion.    Respiratory: Negative for cough, chest tightness and shortness of breath.    Cardiovascular: Negative for chest pain and palpitations.   Gastrointestinal: Positive for abdominal pain, nausea and vomiting. Negative for constipation.   Genitourinary: Negative for dysuria and flank pain.   Musculoskeletal: Negative for back pain, myalgias and neck pain.   Skin: Negative for color change and pallor.   Neurological: Negative for dizziness and light-headedness.   All other systems reviewed and are negative.      Allergies:  No Known  Allergies      Medications:    No current outpatient medications on file.      Past Medical History:    Past Medical History:   Diagnosis Date     Gallstone      Patient Active Problem List    Diagnosis Date Noted     Acute cholecystitis 07/23/2021     Priority: Medium        Past Surgical History:    No past surgical history on file.    Family History:    No family history on file.    Social History:  Lives with child, has support    Physical Exam     Patient Vitals for the past 24 hrs:   BP Temp Temp src Pulse Resp SpO2 Weight   07/23/21 2200 -- -- -- -- -- 100 % --   07/23/21 2145 110/73 -- -- 74 -- 100 % --   07/23/21 2130 108/68 -- -- 76 -- -- --   07/23/21 2116 111/70 98.2  F (36.8  C) Oral 73 -- -- 77.1 kg (170 lb)   07/23/21 2115 111/70 -- -- 71 18 100 % --       Physical Exam  Vitals and nursing note reviewed.   Constitutional:       General: She is not in acute distress.     Appearance: She is well-developed. She is not toxic-appearing.   HENT:      Head: Normocephalic.      Mouth/Throat:      Mouth: Mucous membranes are moist.      Pharynx: Oropharynx is clear.   Eyes:      General: No scleral icterus.     Extraocular Movements: Extraocular movements intact.   Cardiovascular:      Rate and Rhythm: Normal rate and regular rhythm.   Pulmonary:      Effort: Pulmonary effort is normal. No respiratory distress.   Abdominal:      General: Bowel sounds are normal.      Palpations: Abdomen is soft.      Tenderness: There is abdominal tenderness in the right upper quadrant and epigastric area. There is guarding. There is no rebound. Positive signs include Clark's sign. Negative signs include McBurney's sign.   Skin:     General: Skin is warm and dry.      Capillary Refill: Capillary refill takes less than 2 seconds.   Neurological:      General: No focal deficit present.      Mental Status: She is alert and oriented to person, place, and time.   Psychiatric:         Mood and Affect: Mood normal.         Behavior:  Behavior normal.         Emergency Department Course   ECG:  ECG taken at 2130, ECG read at 2140  Normal sinus rhythm, consider anterior ischemia   Rate 72 bpm. DC interval 152 ms. QRS duration 82 ms. QT/QTc 404/442 ms. P-R-T axes 78 -1 31.     Imaging:  US Abdomen Limited (RUQ)   Final Result   IMPRESSION:   1.  Gallbladder is filled with gallstones. Although there is no definite wall thickening (measurement being compromised by amount of gallstones), the sonographic Clark sign was reported to be positive. This could reflect early changes of cholecystitis.    Recommend clinical correlation.                Laboratory:  Labs Ordered and Resulted from Time of ED Arrival Up to the Time of Departure from the ED   COMPREHENSIVE METABOLIC PANEL - Abnormal; Notable for the following components:       Result Value    Chloride 112 (*)     Glucose 126 (*)     AST 91 (*)     ALT 76 (*)     All other components within normal limits   ROUTINE UA WITH MICROSCOPIC REFLEX TO CULTURE - Abnormal; Notable for the following components:    Protein Albumin Urine 10  (*)     Bacteria Urine Few (*)     Mucus Urine Present (*)     All other components within normal limits    Narrative:     Urine Culture not indicated   CBC WITH PLATELETS AND DIFFERENTIAL - Abnormal; Notable for the following components:    WBC Count 15.6 (*)     Absolute Neutrophils 12.3 (*)     Absolute Immature Granulocytes 0.1 (*)     All other components within normal limits   LIPASE - Normal   HCG QUALITATIVE URINE - Normal   SARS-COV2 (COVID-19) VIRUS RT-PCR   COVID-19 VIRUS (CORONAVIRUS) BY PCR    Narrative:     The following orders were created for panel order Asymptomatic COVID-19 Virus (Coronavirus) by PCR Nasopharyngeal.  Procedure                               Abnormality         Status                     ---------                               -----------         ------                     SARS-COV2 (COVID-19) Vir...[[id number]]                                                    Please view results for these tests on the individual orders.   CBC WITH PLATELETS & DIFFERENTIAL    Narrative:     The following orders were created for panel order CBC with platelets differential.  Procedure                               Abnormality         Status                     ---------                               -----------         ------                     CBC with platelets and d...[[id number]]  Abnormal            Final result                 Please view results for these tests on the individual orders.     Emergency Department Course:    Reviewed:  I reviewed nursing notes and vitals    Assessments:   I obtained history and examined the patient as noted above.    I rechecked the patient and explained findings. We recommend admission for acute cholecystitis management. Patient agrees, all questions were answered. Dr Torrez spoke with hospitalist who accepted patient; we will initiate antibiotics in the ED.    Interventions:  Medications   ondansetron (ZOFRAN) injection 4 mg (has no administration in time range)   cefTRIAXone (ROCEPHIN) 1 g vial to attach to  mL bag for ADULTS or NS 50 mL bag for PEDS (has no administration in time range)   metroNIDAZOLE (FLAGYL) infusion 500 mg (has no administration in time range)       Disposition:  Admission for management of acute cholecystitis.    Impression & Plan      Medical Decision Makin-year-old woman significant history of gallstones and cholecystitis who still has her gallbladder presenting with right upper quadrant pain.  Differential diagnosis includes but is not limited to cholecystitis or other biliary obstruction due to gallstones, pancreatitis, acute hepatitis, kidney stones, pyelonephritis, gastritis, colitis.  Is not reporting bowel symptoms, afebrile, nontoxic-appearing, no urinary symptoms.  Labs significant for mild transaminitis and leukocytosis, right upper quadrant ultrasound shows multiple stones in  the gallbladder such that complete common bile duct could not be visualized. Patient will be admitted for management of acute cholecystitis and given first dose of Abx in the ED. All questions were answered and patient agreed with plan.    Sofia Yoon MD PGY-2    Covid-19  Kalli Negrete was evaluated during a global COVID-19 pandemic, which necessitated consideration that the patient might be at risk for infection with the SARS-CoV-2 virus that causes COVID-19.   Applicable protocols for evaluation were followed during the patient's care.   COVID-19 was considered as part of the patient's evaluation. The plan for testing is:  a test was obtained during this visit.    Diagnosis:    ICD-10-CM    1. Acute cholecystitis  K81.0        Discharge Medications:  New Prescriptions    No medications on file        Sofia Yoon MD  Resident  07/23/21 [id number]       Sofia Yoon MD  Resident  07/23/21 [id number]

## 2021-07-24 NOTE — PHARMACY-ADMISSION MEDICATION HISTORY
"Admission medication history interview status for this patient is complete. See [facility] admission navigator for allergy information, prior to admission medications and immunization status.     Medication history interview done, indicate source(s): Patient  Medication history resources (including written lists, pill bottles, clinic record):None  Pharmacy: Not ID\"d     Changes made to PTA medication list:  Added: None  Changed: None  Reported as Not Taking: None  Removed: None    Actions taken by pharmacist (provider contacted, etc):None     Additional medication history information:None    Medication reconciliation/reorder completed by provider prior to medication history?  N   (Y/N)     Prior to Admission medications    Not on File         "

## 2021-07-24 NOTE — PLAN OF CARE
PRIMARY DIAGNOSIS: UNCOMPLICATED EARLY ACUTE CHOLECYSTITIS  OUTPATIENT/OBSERVATION GOALS TO BE MET BEFORE DISCHARGE:    1. Pain status: Currently denies pain.  2. Stable vital signs and labs (if performed) at disposition: Yes  3. Tolerating adequate PO diet:  NPO for expected surgery today.  4. Successful cholecystectomy or clear follow up plan with General Surgery team if immediate surgery not performed No - will go to OR this afternoon.   5. ADLs back to baseline?  Yes  6. Activity and level of assistance: Ambulating independently.  7. Barriers to discharge noted Yes - OR this afternoon.    Discharge Planner Nurse   Safe discharge environment identified: Yes  Barriers to discharge: Yes - OR this afternoon.       Entered by: Rebekah Dudley 07/24/2021        Plan for patient to discharge from PACU if cholangiograms are WNL per hospitalist.     Please review provider order for any additional goals.   Nurse to notify provider when observation goals have been met and patient is ready for discharge.

## 2021-07-24 NOTE — BRIEF OP NOTE
[facility]    Brief Operative Note    Pre-operative diagnosis: Acute cholecystitis [K81.0]  Post-operative diagnosis Biliary colic    Procedure: Procedure(s):  CHOLECYSTECTOMY, LAPAROSCOPIC, WITH CHOLANGIOGRAM  Surgeon: Surgeon(s) and Role:     * Margarito Sylvester MD - Primary     * Amanda Resendez PA-C - Assisting  Anesthesia: General   Estimated blood loss: Less than 10 ml  Drains: None  Specimens:   ID Type Source Tests Collected by Time Destination   1 : Gallbladder and Contents Tissue Gallbladder SURGICAL PATHOLOGY EXAM Margarito Sylvester MD 7/24/2021  2:45 PM      Findings:   Gallbladder filled with stones, normal cholangiograms..  Complications: None.  Implants: * No implants in log *

## 2021-07-24 NOTE — OP NOTE
General Surgery Operative Note      Pre-operative diagnosis: symptomatic gallstones and elevated LFTs   Post-operative diagnosis: same    Procedure: laparoscopic cholecystectomy  intraoperative cholangiogram     Surgeon: Margarito Sylvester MD   Assistant(s): Amanda Resendez PA-C  The Physician Assistant was medically necessary for their expertise in prepping, camera management, suctioning, suturing and retraction.   Anesthesia: general    Estimated blood loss:   5 cc     Specimens: gallbladder and contents     DESCRIPTION OF PROCEDURE: The patient was taken to the operating room and placed on the table in supine position. General endotracheal anesthesia was induced and the abdomen was prepped and draped in standard sterile fashion. An incision was made just above the umbilicus with a blade. The incision was carried down to the fascia. The fascia was incised in the midline with a blade. The peritoneum was entered bluntly with a Carmalt clamp. Two interrupted 0 Vicryl sutures were placed at the extremes of this fascial incision. The Tay trocar was introduced and the abdomen was insufflated with CO2. A 5 mm trocar was placed in the subxiphoid position. A 5 mm trocar was placed in the right upper quadrant, just below the costal margin at the midclavicular line. Another was placed at the anterior axillary line just below the costal margin on the right. The patient was placed in reverse Trendelenburg and right side up. The gallbladder appeared completely filled with stones but otherwise grossly normal. The fundus of the gallbladder was grasped and retracted cephalad. The infundibulum was grasped and retracted laterally. The peritoneum over the medial and lateral aspects of the triangle of Calot was taken down with the [place] dissector.  The triangle of Calot was skeletonized, thus obtaining the critical view of safety.  The infundibulum of the gallbladder was grasped with the Root clamp. Thereby the  cholangiogram catheter was introduced and used to canulate the infundibulum of the gallbladder. The cholangiogram revealed a biliary tree without filling defects. The radiologist confirmed these findings. The cholangiogram catheter was removed from the abdomen. The duct was thrice clipped and then transected, leaving two clips on the cystic duct stump. The cystic artery was freed up from surrounding tissues. It was clipped twice proximally, once distally and transected with the hook scissors. A thorough evaluation of the triangle of Calot revealed no aberrant ducts or vessels. The gallbladder was then removed from the liver using the hook electrocautery. The gallbladder was passed into an Endocatch bag and removed through the umbilical trocar site. We observed the right upper quadrant carefully for hemostasis. Hemostasis was assured. We irrigated with copious amounts of sterile saline and aspirated the effluent. The right upper quadrant trocar sites were anesthetized with local anesthetic. Each of the trocars was removed under direct visualization. There was no bleeding from any of these sites.  The Tay trocar was removed and the abdomen was evacuated of CO2.  One additional interrupted 0 Vicryl suture was placed in the umbilical trocar site fascia.  Each of the three 0 Vicryl sutures was cinched down and tied. The skin of the umbilical incision was anesthetized with local anesthetic.  All of the incisions were closed with interrupted 4-0 Vicryl subcuticular sutures and skin glue.  The patient tolerated the procedure well.  Sponge and instrument counts were correct.    Margarito Stewart MD

## 2021-07-24 NOTE — DISCHARGE SUMMARY
[facility] Out[facility] / Observation Unit  Discharge Summary        Kalli Negrete MRN# [id number]   YOB: 1990 Age: 30 year old     Date of Admission:  7/23/2021  Date of Discharge:  7/24/2021  Admitting Physician:  Sarahi Connor MD  Discharge Physician: Rema Montes De Oca PA-C  Discharging Service: Hospitalist      Primary Provider: No Ref-Primary, Physician  Primary Care Physician Phone Number: None         Primary Discharge Diagnoses:    Kalli Negrete is a 30 year old female with a history of gallstones admitted on 7/23/2021 with RUQ pain, mildly elevated LFTs with US showing GB full of gallstones with a positive hood's sign consistent with cholecystitis.     1. Acute cholecystitis  2. S/p laparoscopic cholecystectomy  3. Elevated LFTs: likely 2/2 acute cholecystitis with intraoperative cholangiogram negative. Recommend outpatient follow up in 3-4 weeks to recheck LFTs for improvement.          Secondary Discharge Diagnoses:     Past Medical History:   Diagnosis Date     Gallstone             Code Status:      Full Code        Brief Hospital Summary:        Reason for your hospital stay      You were admitted for concerns of stomach pain and nausea related to your   gallbladder. Imaging in the ER showed that your gallbladder was filled   with gallstones. There was not evidence of a stone in the biliary tract.   You were not treated with antibiotics. Your pain and nausea was controlled   with narcotics and anti nausea medications. You were seen by surgery who   opted to remove your gallbladder. You were doing fine post operatively and   allowed to discharge home.           Please refer to initial admission history and physical for further details.   Briefly, Kalli Negrete was admitted on 7/23/2021 with intractable biliary colic/acute cholecystitis. Initial work up in the ED did not reveal evidence of sepsis to require inpatient hospitalization. Pt was registered to the Observation Unit for pain  control and supportive measures.     Pt was resuscitated with IVF, and anti-emetics. Laboratory and imaging results indicated: RUQ ultrasound showing gallbladder filled with gallstones and elevated LFTs. General surgery was consulted. Patient underwent laparoscopic cholecystectomy (please see detailed operative report). Post -op, pt did well. Pain control was transitioned from IV to PO form. At the time of discharge, pt's pain was controlled and was able to tolerate PO intake.  Medications were reviewed. Pt is instructed to follow up as below.           Significant Lab During Hospitalization:      Recent Labs   Lab 07/24/21  0556 07/23/21 2129   WBC 8.9 15.6*   HGB 13.1 13.0   HCT 40.7 40.0   MCV 91 93    181     Recent Labs   Lab 07/24/21 0556 07/23/21 2129    140   POTASSIUM 3.7 3.6   CHLORIDE 112* 112*   CO2 24 24   ANIONGAP 5 4   * 126*   BUN 12 13   CR 0.65 0.68   GFRESTIMATED >90 >90   SHANTA 9.0 8.8   PROTTOTAL 7.0 7.2   ALBUMIN 3.3* 3.5   BILITOTAL 0.9 0.4   ALKPHOS 162* 111   AST 1,162* 91*   * 76*              Significant Imaging During Hospitalization:      Results for orders placed or performed during the hospital encounter of 07/23/21   US Abdomen Limited (RUQ)    Narrative    EXAM: US ABDOMEN LIMITED  LOCATION: [facility]  DATE/TIME: 7/23/2021 11:08 PM    INDICATION: Hx gallstones, RUQ pain  COMPARISON: None.  TECHNIQUE: Limited abdominal ultrasound.    FINDINGS:    GALLBLADDER: Gallbladder is filled with shadowing stones. No definite wall thickening. Sonographic Clark sign was reported to be positive.    BILE DUCTS: No biliary dilatation. The common duct measures 2 mm.    LIVER: Normal parenchyma with smooth contour. No focal mass.    RIGHT KIDNEY: No hydronephrosis.    PANCREAS: The visualized portions are normal.    No ascites.      Impression    IMPRESSION:  1.  Gallbladder is filled with gallstones. Although there is no definite wall thickening  "(measurement being compromised by amount of gallstones), the sonographic Clark sign was reported to be positive. This could reflect early changes of cholecystitis.   Recommend clinical correlation.                    Pending Results:      None        Consultations This Hospital Stay:      Consultation during this admission received from surgery         Discharge Instructions and Follow-Up:      Follow-up Appointments     Follow-up and recommended labs and tests       Follow up with Dr. Sylvester of general surgery per their recommendations.  Follow up and establish care with primary care provider to have liver   enzymes rechecked. Recommended labs: LFTs               Discharge Disposition:      Discharged to home         Discharge Medications:      There are no discharge medications for this patient.          Allergies:       No Known Allergies        Condition and Physical on Discharge:      Discharge condition: Stable   Vitals: Blood pressure 124/84, pulse 78, temperature 96.8  F (36  C), temperature source Temporal, resp. rate 16, height 1.6 m (5' 3\"), weight 79.4 kg (175 lb), SpO2 99 %.  175 lbs 0 oz      GENERAL:  Comfortable.  PSYCH: pleasant, oriented, No acute distress.  HEENT:  PERRLA. Normal conjunctiva, normal hearing, nasal mucosa and oropharynx are normal.  NECK:  Supple, no neck vein distention, adenopathy or bruits, normal thyroid.  HEART:  Normal S1, S2 with no murmur, no pericardial rub, gallops or S3 or S4.  LUNGS:  Clear to auscultation, normal respiratory effort. No wheezing, rales or ronchi.  ABDOMEN:  Soft, no hepatosplenomegaly, normal bowel sounds. No tenderness prior to surgery.   EXTREMITIES:  No pedal edema, +2 radial and posterior tibial pulses bilateral and equal.  SKIN:  Dry to touch, No rash, wound or ulcerations.  NEUROLOGIC:  CN 2-12 intact, BL 5/5 symmetric upper and lower extremity strength, sensation is intact with no focal deficits.     Rema Montes De Oca PA-C  "

## 2021-07-24 NOTE — ED NOTES
Bed: ED23  Expected date: 7/23/21  Expected time: 8:37 PM  Means of arrival: Ambulance  Comments:  [facility] 30F

## 2021-07-24 NOTE — CONSULTS
Consult Date: 07/24/2021    REASON FOR ADMISSION:  Acute cholecystitis.    HISTORY OF PRESENT ILLNESS:  Ms. Negrete is a 30-year-old female with a known history of cholelithiasis, who came to the ED with a postprandial upper abdominal pain and nausea.  She had her stones diagnosed about a year ago and has been pregnant in the interim, she has subsequently had a healthy young baby and was feeling relatively well until overnight.  This morning, she feels somewhat better, but has no appetite.  Workup in the ER.  On this occasion showed leukocytosis of 15,000 and a mild transaminitis, ultrasound again confirmed the presence of multiple stones, there was a positive Clark sign on this evaluation as well.  She denies any icterus or dark orange urine.  She has not had previous abdominal surgeries.    PAST MEDICAL AND SURGICAL HISTORY:  She has known cholelithiasis as above, otherwise no chronic medical concerns or previous surgical interventions.    CURRENT MEDICATIONS:  At the time of admission, the patient was on no medications.    ALLERGIES:  THE PATIENT HAS NO REPORTED DRUG ALLERGIES.    SOCIAL HISTORY:  The patient is .  She is here with her .  She is not a smoker.    PHYSICAL EXAMINATION:   VITAL SIGNS:  Ms. Negrete is afebrile, temperature this morning is 98.3, pulse 66 with a blood pressure of 110/68, respiratory rate is 15 with 100% saturations on room air.  GENERAL:  Alert and appropriate, nontoxic.  She has no appreciable scleral icterus.  CARDIOVASCULAR:  Heart sounds are regular.  LUNGS:  Breath sounds are clear.  ABDOMEN:  Soft.  She has no epigastric tenderness, no right upper quadrant tenderness and no Clark's sign on deep inspiration today.    LABORATORY EXAMINATION:  Notable for white blood cell count of 15.6 thousand on admission, currently 8.9.  Transaminases this morning show an ALT and AST of 92 and 1162 respectively, alkaline phosphatase is 160 with a bilirubin of 0.9.  Lipase is  normal at admission.    IMAGING:  The patient's ultrasound was personally reviewed by me shows multiple gallstones filling the gallbladder without obvious wall thickening.  There was a reported positive Clark sign and bile duct, which measured 2 mm.    IMPRESSION AND RECOMMENDATIONS:  A 30-year-old female with known cholelithiasis and about 3-4 attacks in the last year, who had a severe attack, prompting her admission.  but now feels somewhat better.  I offered cholecystectomy versus continued observation, although with her elevated transaminases and multiple attacks.  The patient is interested in pursuing surgery today.  Risks of the operation were therefore outlined with her in detail.  These include infection, bleeding, harm to adjacent structures, open conversion, retained stone, bile leak, common duct injury and chronic diarrhea as well as the potential that she may discharge to home today.  Again, she wished to proceed with surgery.  We will plan on doing so on schedule as time allows.  Thank you very much for this consult..    Margarito Luna MD        D: 2021   T: 2021   MT: marlena    Name:     PANCHO BAIRESSIMON SUSURenaldo  MRN:      [id number]        Account:      [id number]   :      1990           Consult Date: 2021     Document: [id number]

## 2021-07-24 NOTE — DISCHARGE INSTRUCTIONS
GENERAL ANESTHESIA OR SEDATION ADULT DISCHARGE INSTRUCTIONS   SPECIAL PRECAUTIONS FOR 24 HOURS AFTER SURGERY    IT IS NOT UNUSUAL TO FEEL LIGHT-HEADED OR FAINT, UP TO 24 HOURS AFTER SURGERY OR WHILE TAKING PAIN MEDICATION.  IF YOU HAVE THESE SYMPTOMS; SIT FOR A FEW MINUTES BEFORE STANDING AND HAVE SOMEONE ASSIST YOU WHEN YOU GET UP TO WALK OR USE THE BATHROOM.    YOU SHOULD REST AND RELAX FOR THE NEXT 24 HOURS AND YOU MUST MAKE ARRANGEMENTS TO HAVE SOMEONE STAY WITH YOU FOR AT LEAST 24 HOURS AFTER YOUR DISCHARGE.  AVOID HAZARDOUS AND STRENUOUS ACTIVITIES.  DO NOT MAKE IMPORTANT DECISIONS FOR 24 HOURS.    DO NOT DRIVE ANY VEHICLE OR OPERATE MECHANICAL EQUIPMENT FOR 24 HOURS FOLLOWING THE END OF YOUR SURGERY.  EVEN THOUGH YOU MAY FEEL NORMAL, YOUR REACTIONS MAY BE AFFECTED BY THE MEDICATION YOU HAVE RECEIVED.    DO NOT DRINK ALCOHOLIC BEVERAGES FOR 24 HOURS FOLLOWING YOUR SURGERY.    DRINK CLEAR LIQUIDS (APPLE JUICE, GINGER ALE, 7-UP, BROTH, ETC.).  PROGRESS TO YOUR REGULAR DIET AS YOU FEEL ABLE.    YOU MAY HAVE A DRY MOUTH, A SORE THROAT, MUSCLES ACHES OR TROUBLE SLEEPING.  THESE SHOULD GO AWAY AFTER 24 HOURS.    CALL YOUR DOCTOR FOR ANY OF THE FOLLOWING:  SIGNS OF INFECTION (FEVER, GROWING TENDERNESS AT THE SURGERY SITE, A LARGE AMOUNT OF DRAINAGE OR BLEEDING, SEVERE PAIN, FOUL-SMELLING DRAINAGE, REDNESS OR SWELLING.    IT HAS BEEN OVER 8 TO 10 HOURS SINCE SURGERY AND YOU ARE STILL NOT ABLE TO URINATE (PASS WATER).     HOME CARE FOLLOWING LAPAROSCOPIC CHOLECYSTECTOMY  FAIZA Guerrier, BARBY Parish. LESLIE Stewart &  CLEOPATRA Mac      IINCISIONAL CARE:  Replace the bandage over your incisions until all drainage stops, or if more comfortable to have in place.  If present, leave the steri-strips (white paper tapes) in place for 14 days after surgery.  If Dermabond (a type of skin glue) is present, leave in place until it wears/flakes off.   BATHING:  Avoid baths for 1 week after surgery.  Showers are  okay.  You may wash your hair at any time.  Gently pat your incisions dry after bathing.  ACTIVITY:  Light Activity -- you may immediately be up and about as tolerated.  Driving -- you may drive when comfortable and off narcotic pain medications.  Light Work -- resume when comfortable off pain medications.  (If you can drive, you probably can work.)  Strenuous Work/Activity -- limit lifting to 20 pounds for 2 weeks.  Progressively increase with time.  Active Sports (running, biking, etc.) -- cautiously resume after 2 weeks.  DISCOMFORT:  Use pain medications as prescribed by your surgeon.  Take the pain medication with some food, when possible, to minimize side effects.  Intermittent use of ice packs at the incision sites may help during the first 48 hours.  Expect gradual improvement.  You may experience shoulder pain, which is due to the air placed within your abdomen during the procedure.  This is temporary and usually passes within 2 days.  DIET:  Drink plenty of fluids.  While taking pain medications, increase dietary fiber or add a fiber supplementation like Metamucil or Citrucel to help prevent constipation - a possible side effect of pain medications.  If taking Metamucil or Citrucel, take with plenty of fluids as instructed.  It is not uncommon to experience some bowel changes (loose stools or constipation) after surgery.  Your body has to adapt to you no longer having a gall bladder.  To help minimize this side effect, avoid fatty foods for the first week after surgery.  You may then slowly increase the amount of fatty foods in your diet.    NAUSEA:  If nauseated from the anesthetic/pain meds; rest in bed, get up cautiously with assistance, and drink clear liquids (juice, tea, broth).  FOLLOW-UP AFTER SURGERY:  Our office will contact you approximately 2-3 weeks after surgery to check on your progress and answer any questions you may have.  If you are doing well, you will not need to return for an office  appointment.  If any concerns are identified over the phone, we will help you make an appointment to see a provider.  If you have not received a phone call, have any questions or concerns, or would like to be seen, please call us at [phone].  We are located at [address], [facility] [address] [address].    CONTACT US IF THE FOLLOWING DEVELOPS:  1.  A fever that is above 101   2.  If there is a large amount of drainage, bleeding, or swelling.  3.  Severe pain that is not relieved by your prescription.  4.  Drainage that is thick, cloudy, yellow, green or white.                                                             5.  Any other questions not answered by  Frequently Asked Questions  sheet.

## 2021-07-24 NOTE — ED PROVIDER NOTES
Emergency Department Attending Supervision Note  7/23/2021  9:42 PM      I evaluated this patient with Radha Tatum, PGY-2, FM.       Briefly, the patient presented with epigastric abdominal pain worse with eating.  She had similar pain 8 months prior, diagnosed with gallstones at that time, but declined surgery due to of pregnancy.  Today she presents with worse epigastric abdominal pain, 30 minutes after eating.  She denies fevers or chills.  She has no chest pain, chest pressure or shortness of breath.      On my exam, RUQ tenderness. Clear heart and lungs     Workup is notable for  US Abdomen Limited (RUQ)   Final Result   IMPRESSION:   1.  Gallbladder is filled with gallstones. Although there is no definite wall thickening (measurement being compromised by amount of gallstones), the sonographic Clark sign was reported to be positive. This could reflect early changes of cholecystitis.    Recommend clinical correlation.              Labs Ordered and Resulted from Time of ED Arrival Up to the Time of Departure from the ED   COMPREHENSIVE METABOLIC PANEL - Abnormal; Notable for the following components:       Result Value    Chloride 112 (*)     Glucose 126 (*)     AST 91 (*)     ALT 76 (*)     All other components within normal limits   ROUTINE UA WITH MICROSCOPIC REFLEX TO CULTURE - Abnormal; Notable for the following components:    Protein Albumin Urine 10  (*)     Bacteria Urine Few (*)     Mucus Urine Present (*)     All other components within normal limits    Narrative:     Urine Culture not indicated   CBC WITH PLATELETS AND DIFFERENTIAL - Abnormal; Notable for the following components:    WBC Count 15.6 (*)     Absolute Neutrophils 12.3 (*)     Absolute Immature Granulocytes 0.1 (*)     All other components within normal limits   LIPASE - Normal   HCG QUALITATIVE URINE - Normal   SARS-COV2 (COVID-19) VIRUS RT-PCR   COVID-19 VIRUS (CORONAVIRUS) BY PCR    Narrative:     The following orders were created  for panel order Asymptomatic COVID-19 Virus (Coronavirus) by PCR Nasopharyngeal.  Procedure                               Abnormality         Status                     ---------                               -----------         ------                     SARS-COV2 (COVID-19) Vir...[[id number]]                      In process                   Please view results for these tests on the individual orders.   CBC WITH PLATELETS & DIFFERENTIAL    Narrative:     The following orders were created for panel order CBC with platelets differential.  Procedure                               Abnormality         Status                     ---------                               -----------         ------                     CBC with platelets and d...[[id number]]  Abnormal            Final result                 Please view results for these tests on the individual orders.       In summary, my impression is acute cholecystitis as evidenced by leukocytosis, positive sonographic Clark's and significant amount of gallstones on ultrasound.  She was given IV antibiotics (ceftriaxone and Flagyl).  I discussed the case with Dr. Clark accepted the patient obvious plan for surgical consultation in the morning.    (K81.0) Acute cholecystitis    Disposition:  Admit    Kalia Torrez MD  [facility]  [facility] Emergency Department    9:42 PM 07/23/21             Kalia Torrez MD  07/24/21 0008

## 2021-07-26 LAB
ATRIAL RATE - MUSE: 72 BPM
DIASTOLIC BLOOD PRESSURE - MUSE: NORMAL MMHG
INTERPRETATION ECG - MUSE: NORMAL
P AXIS - MUSE: 78 DEGREES
PR INTERVAL - MUSE: 152 MS
QRS DURATION - MUSE: 82 MS
QT - MUSE: 404 MS
QTC - MUSE: 442 MS
R AXIS - MUSE: -1 DEGREES
SYSTOLIC BLOOD PRESSURE - MUSE: NORMAL MMHG
T AXIS - MUSE: 31 DEGREES
VENTRICULAR RATE- MUSE: 72 BPM

## 2021-07-27 LAB
PATH REPORT.COMMENTS IMP SPEC: NORMAL
PATH REPORT.COMMENTS IMP SPEC: NORMAL
PATH REPORT.FINAL DX SPEC: NORMAL
PATH REPORT.GROSS SPEC: NORMAL
PATH REPORT.MICROSCOPIC SPEC OTHER STN: NORMAL
PATH REPORT.RELEVANT HX SPEC: NORMAL
PHOTO IMAGE: NORMAL

## 2021-07-27 PROCEDURE — 88304 TISSUE EXAM BY PATHOLOGIST: CPT | Mod: 26 | Performed by: PATHOLOGY

## 2021-08-12 ENCOUNTER — TELEPHONE (OUTPATIENT)
Dept: SURGERY | Facility: CLINIC | Age: 31
End: 2021-08-12

## 2021-08-12 NOTE — TELEPHONE ENCOUNTER
Attempted to call patient for post op check.  No answer.  Message was left for patient to call back if they had any questions of concerns.     Amanda Resendez PA-C

## (undated) DEVICE — GLOVE PROTEXIS BLUE W/NEU-THERA 8.0  2D73EB80

## (undated) DEVICE — ENDO TROCAR BLUNT TIP KII BALLOON 12X100MM C0R47

## (undated) DEVICE — ENDO SPONGE ENDOSTICK KITTNER 5MM CHERRY 37002010

## (undated) DEVICE — BAG CLEAR TRASH 1.3M 39X33" P4040C

## (undated) DEVICE — SOL NACL 0.9% IRRIG 1000ML BOTTLE 2F7124

## (undated) DEVICE — SOL NACL 0.9% IRRIG 3000ML BAG 2B7477

## (undated) DEVICE — Device

## (undated) DEVICE — LINEN FULL SHEET 5511

## (undated) DEVICE — BLADE KNIFE SURG 11 371111

## (undated) DEVICE — RAD RX ISOVUE 300 (50ML) 61% IOPAMIDOL CHARGE PER ML

## (undated) DEVICE — SU VICRYL 0 UR-6 27" J603H

## (undated) DEVICE — ADH SKIN CLOSURE PREMIERPRO EXOFIN MICOR HV 0.5ML 3471

## (undated) DEVICE — DRAPE LEGGINGS 8421

## (undated) DEVICE — CATH CHOLANGIOGRAM KUMAR CC-019

## (undated) DEVICE — LINEN TOWEL PACK X10 5473

## (undated) DEVICE — CLIP APPLIER ENDO 5MM M/L LIGAMAX EL5ML

## (undated) DEVICE — PREP CHLORAPREP 26ML TINTED HI-LITE ORANGE 930815

## (undated) DEVICE — ESU CORD MONOPOLAR 10'  E0510

## (undated) DEVICE — ENDO POUCH UNIV RETRIEVAL SYSTEM INZII 10MM CD001

## (undated) DEVICE — ENDO TROCAR SLEEVE KII Z-THREADED 05X100MM CTS02

## (undated) DEVICE — LINEN HALF SHEET 5512

## (undated) DEVICE — GLOVE PROTEXIS POWDER FREE 7.5 ORTHOPEDIC 2D73ET75

## (undated) DEVICE — ESU GROUND PAD ADULT W/CORD E7507

## (undated) DEVICE — SOL NACL 0.9% INJ 250ML BAG 2B1322Q

## (undated) DEVICE — SUCTION CANISTER MEDIVAC LINER 3000ML W/LID 65651-530

## (undated) DEVICE — ENDO TROCAR FIRST ENTRY KII FIOS ADV FIX 05X100MM CFF03

## (undated) DEVICE — LINEN POUCH DBL 5427

## (undated) DEVICE — SU VICRYL 4-0 PS-2 18" UND J496H

## (undated) DEVICE — DRAPE C-ARM 60X42" 1013

## (undated) RX ORDER — LIDOCAINE HYDROCHLORIDE 10 MG/ML
INJECTION, SOLUTION EPIDURAL; INFILTRATION; INTRACAUDAL; PERINEURAL
Status: DISPENSED
Start: 2021-07-24

## (undated) RX ORDER — HYDROCODONE BITARTRATE AND ACETAMINOPHEN 5; 325 MG/1; MG/1
TABLET ORAL
Status: DISPENSED
Start: 2021-07-24

## (undated) RX ORDER — GLYCOPYRROLATE 0.2 MG/ML
INJECTION INTRAMUSCULAR; INTRAVENOUS
Status: DISPENSED
Start: 2021-07-24

## (undated) RX ORDER — NEOSTIGMINE METHYLSULFATE 1 MG/ML
VIAL (ML) INJECTION
Status: DISPENSED
Start: 2021-07-24

## (undated) RX ORDER — FENTANYL CITRATE 50 UG/ML
INJECTION, SOLUTION INTRAMUSCULAR; INTRAVENOUS
Status: DISPENSED
Start: 2021-07-24

## (undated) RX ORDER — DEXAMETHASONE SODIUM PHOSPHATE 4 MG/ML
INJECTION, SOLUTION INTRA-ARTICULAR; INTRALESIONAL; INTRAMUSCULAR; INTRAVENOUS; SOFT TISSUE
Status: DISPENSED
Start: 2021-07-24

## (undated) RX ORDER — BUPIVACAINE HYDROCHLORIDE 5 MG/ML
INJECTION, SOLUTION EPIDURAL; INTRACAUDAL
Status: DISPENSED
Start: 2021-07-24

## (undated) RX ORDER — CEFAZOLIN SODIUM 2 G/100ML
INJECTION, SOLUTION INTRAVENOUS
Status: DISPENSED
Start: 2021-07-24